# Patient Record
Sex: MALE | URBAN - METROPOLITAN AREA
[De-identification: names, ages, dates, MRNs, and addresses within clinical notes are randomized per-mention and may not be internally consistent; named-entity substitution may affect disease eponyms.]

---

## 2019-10-02 ENCOUNTER — APPOINTMENT (RX ONLY)
Dept: URBAN - METROPOLITAN AREA CLINIC 2 | Facility: CLINIC | Age: 59
Setting detail: DERMATOLOGY
End: 2019-10-02

## 2019-10-02 DIAGNOSIS — R68.3 CLUBBING OF FINGERS: ICD-10-CM

## 2019-10-02 DIAGNOSIS — L60.3 NAIL DYSTROPHY: ICD-10-CM

## 2019-10-02 DIAGNOSIS — M72.0 PALMAR FASCIAL FIBROMATOSIS [DUPUYTREN]: ICD-10-CM

## 2019-10-02 DIAGNOSIS — L60.8 OTHER NAIL DISORDERS: ICD-10-CM

## 2019-10-02 PROBLEM — H91.90 UNSPECIFIED HEARING LOSS, UNSPECIFIED EAR: Status: ACTIVE | Noted: 2019-10-02

## 2019-10-02 PROCEDURE — 99202 OFFICE O/P NEW SF 15 MIN: CPT

## 2019-10-02 PROCEDURE — ? OTHER

## 2019-10-02 PROCEDURE — ? TREATMENT REGIMEN

## 2019-10-02 PROCEDURE — ? COUNSELING

## 2019-10-02 ASSESSMENT — LOCATION DETAILED DESCRIPTION DERM
LOCATION DETAILED: LEFT RADIAL PALM
LOCATION DETAILED: RIGHT DISTAL PLANTAR GREAT TOE
LOCATION DETAILED: LEFT GREAT TOENAIL
LOCATION DETAILED: LEFT INDEX FINGER LUNULA
LOCATION DETAILED: RIGHT INDEX FINGER LUNULA
LOCATION DETAILED: RIGHT RADIAL PALM
LOCATION DETAILED: LEFT THUMBNAIL

## 2019-10-02 ASSESSMENT — LOCATION SIMPLE DESCRIPTION DERM
LOCATION SIMPLE: LEFT GREAT TOE
LOCATION SIMPLE: LEFT HAND
LOCATION SIMPLE: RIGHT GREAT TOE
LOCATION SIMPLE: LEFT INDEX FINGERNAIL
LOCATION SIMPLE: LEFT THUMB
LOCATION SIMPLE: RIGHT INDEX FINGERNAIL
LOCATION SIMPLE: RIGHT HAND

## 2019-10-02 ASSESSMENT — LOCATION ZONE DERM
LOCATION ZONE: HAND
LOCATION ZONE: FINGERNAIL
LOCATION ZONE: TOE
LOCATION ZONE: TOENAIL

## 2019-10-02 NOTE — PROCEDURE: OTHER
Note Text (......Xxx Chief Complaint.): This diagnosis correlates with the
Other (Free Text): Patient counseled for several minutes regarding options for smoking cessation.
Detail Level: Detailed